# Patient Record
Sex: MALE | Race: WHITE | NOT HISPANIC OR LATINO | Employment: FULL TIME | URBAN - METROPOLITAN AREA
[De-identification: names, ages, dates, MRNs, and addresses within clinical notes are randomized per-mention and may not be internally consistent; named-entity substitution may affect disease eponyms.]

---

## 2020-05-31 ENCOUNTER — HOSPITAL ENCOUNTER (EMERGENCY)
Facility: HOSPITAL | Age: 45
Discharge: HOME | End: 2020-05-31
Attending: EMERGENCY MEDICINE
Payer: COMMERCIAL

## 2020-05-31 VITALS
HEART RATE: 75 BPM | OXYGEN SATURATION: 98 % | DIASTOLIC BLOOD PRESSURE: 87 MMHG | SYSTOLIC BLOOD PRESSURE: 128 MMHG | HEIGHT: 72 IN | BODY MASS INDEX: 21.67 KG/M2 | WEIGHT: 160 LBS | RESPIRATION RATE: 17 BRPM | TEMPERATURE: 99 F

## 2020-05-31 DIAGNOSIS — S61.511A: ICD-10-CM

## 2020-05-31 DIAGNOSIS — S61.511A WRIST LACERATION, RIGHT, INITIAL ENCOUNTER: Primary | ICD-10-CM

## 2020-05-31 PROCEDURE — 99283 EMERGENCY DEPT VISIT LOW MDM: CPT | Mod: 25

## 2020-05-31 PROCEDURE — 12001 RPR S/N/AX/GEN/TRNK 2.5CM/<: CPT | Mod: RT

## 2020-05-31 RX ORDER — CEPHALEXIN 250 MG/1
250 CAPSULE ORAL 4 TIMES DAILY
Qty: 28 CAPSULE | Refills: 0 | Status: SHIPPED | OUTPATIENT
Start: 2020-05-31 | End: 2020-06-07

## 2020-05-31 RX ORDER — LIDOCAINE HYDROCHLORIDE AND EPINEPHRINE 10; 10 MG/ML; UG/ML
10 INJECTION, SOLUTION INFILTRATION; PERINEURAL
Status: DISCONTINUED | OUTPATIENT
Start: 2020-05-31 | End: 2020-05-31 | Stop reason: HOSPADM

## 2020-05-31 NOTE — DISCHARGE INSTRUCTIONS
Tylenol for pain  Please follow-up with the hand surgeon as directed for definitive care  Return if you developed increased swelling, weakness to her fingers, decreased sensation to your hand or any other concerns.  Keflex as directed until all gone

## 2020-05-31 NOTE — ED PROVIDER NOTES
Encounter Date: 5/31/2020       History     Chief Complaint   Patient presents with    Laceration        45-year-old male presents to the emergency department with a laceration to the right medial aspect of the wrist patient reports that he was at home he moved his kitchen table and was cleaning the floors he states he has a pendant she and earlier above his table that he did not realize was hanging low and reports that he hit that she and earlier which subsequently shattered a piece of glass punctured his wrist on caused a laceration the patient is right-hand dominant he reports his tetanus is less than 5 years he denies any distal numbness or tingling, or weakness to his fingers.        Review of patient's allergies indicates:  No Known Allergies  Past Medical History:   Diagnosis Date    Anxiety      Past Surgical History:   Procedure Laterality Date    FINGER SURGERY Right     CHILDHOOD INJURY     No family history on file.  Social History     Tobacco Use    Smoking status: Former Smoker    Tobacco comment: QUIT IN EARLY 20'S   Substance Use Topics    Alcohol use: Yes     Comment: SOCIALLY    Drug use: No     Review of Systems   Constitutional: Negative.    HENT: Negative.    Respiratory: Negative.    Cardiovascular: Negative.    Gastrointestinal: Negative.    Genitourinary: Negative.    Skin: Positive for wound.   Allergic/Immunologic: Negative.    Neurological: Negative.    Hematological: Negative.    Psychiatric/Behavioral: Negative.    All other systems reviewed and are negative.      Physical Exam     Initial Vitals [05/31/20 1043]   BP Pulse Resp Temp SpO2   (!) 144/91 89 16 99.3 °F (37.4 °C) 98 %      MAP       --         Physical Exam    Nursing note and vitals reviewed.  Constitutional: He appears well-developed and well-nourished.   HENT:   Head: Normocephalic.   Right Ear: External ear normal.   Left Ear: External ear normal.   Nose: Nose normal.   Eyes: EOM are normal. Pupils are equal, round,  and reactive to light.   Neck: Normal range of motion. Neck supple.   Cardiovascular: Normal rate, regular rhythm, normal heart sounds and intact distal pulses.   Pulmonary/Chest: Breath sounds normal.   Abdominal: Soft. Bowel sounds are normal.   Musculoskeletal: He exhibits no tenderness.   Neurological: He is oriented to person, place, and time. He has normal strength. GCS score is 15. GCS eye subscore is 4. GCS verbal subscore is 5. GCS motor subscore is 6.   Skin:   1.5 cm laceration medial aspect of the right wrist   Psychiatric: He has a normal mood and affect.         ED Course   Lac Repair  Date/Time: 5/31/2020 12:39 PM  Performed by: GUILLERMO Escobar  Authorized by: Eulalio Berger DO   Body area: upper extremity  Location details: right wrist  Laceration length: 1.5 cm  Foreign bodies: no foreign bodies  Tendon involvement: superficial  Nerve involvement: none  Anesthesia: local infiltration    Anesthesia:  Local Anesthetic: lidocaine 1% with epinephrine  Anesthetic total: 5 mL  Patient sedated: no  Preparation: Patient was prepped and draped in the usual sterile fashion.  Irrigation solution: saline  Irrigation method: syringe  Amount of cleaning: extensive  Debridement: none  Degree of undermining: none  Skin closure: 4-0 nylon  Fascia closure: 4-0 Vicryl  Number of sutures: 5  Technique: simple  Approximation: close  Approximation difficulty: simple  Patient tolerance: Patient tolerated the procedure well with no immediate complications  Comments: Patient noted to have some what appears to be superficial arterial bleeding that was able to be maintained with 2 4-0 Vicryl sutures skin closed with 3 4-0 nylon sutures.  After suturing patient remains neurovascularly intact radial pulses normal cap refill is normal.  Patient will be observed for any expanding hematoma        Labs Reviewed - No data to display       Imaging Results          X-Ray Wrist Complete Right (Final result)  Result time 05/31/20  11:17:26    Final result by Colin Ha MD (05/31/20 11:17:26)                 Narrative:    REASON: Pain.    TECHNIQUE: 3 radiographic views of the right wrist.    COMPARISON: None.    FINDINGS:    No acute fracture or dislocation. No destructive osseous lesions. The  joint spaces of the wrist and hand are intact. No soft tissue swelling  identified. Faint circular densities are noted projecting in the  anterior distal forearm soft tissues, seen only on lateral view.    IMPRESSION:    1.  No acute osseous abnormality.  2.  Faint circular densities projecting in the anterior distal  forearm soft tissues seen on lateral view only may reflect soft tissue  calcifications or be external to the patient.    Electronically Signed by Colin Ha on 5/31/2020 11:23 AM                               Medical Decision Making:   Initial Assessment:   Laceration left wrist   Differential Diagnosis:   Simple laceration, tendon laceration, nerve injury, arterial injury  Clinical Tests:   Radiological Study: Ordered and Reviewed  ED Management:  45-year-old male presents to the emergency department status post laceration to the medial aspect of the right wrist patient states that a she had earlier fell a piece of broken glass lacerated his skin patient's is neurovascularly intact on exam he has no deficits noted he is able to perform flexion and extension of the wrist with passive resistance he is able to close all fingers with some pointing to the scaphoid he has no weakness to his fingers at any joint with passive resistance capillary refill is intact radial pulses intact.  On physical exam patient did have a superficial arterial bleeder that was maintained with to internal sutures skin closed with 3 nylon sutures.  Patient was observed for approximately 1 hr in the emergency department he had no residual hematoma noted and again of is neurovascularly intact patient was noted to have a superficial tendon injury to and  extensor tendon of the wrist he will be instructed to follow-up with a hand surgeon for further evaluation and definitive care he was given detailed return precautions the patient was personally evaluated by the attending physician Dr. Berger before suturing and after words.  He is in agreement with patient's care and disposition.                                 Clinical Impression:       ICD-10-CM ICD-9-CM   1. Wrist laceration, right, initial encounter S61.511A 881.02   2. Laceration of skin of right wrist S61.511A 881.02                                Jossie Mo, GUILLERMO  05/31/20 1310

## 2021-05-10 ENCOUNTER — PATIENT MESSAGE (OUTPATIENT)
Dept: RESEARCH | Facility: HOSPITAL | Age: 46
End: 2021-05-10